# Patient Record
Sex: MALE | Race: BLACK OR AFRICAN AMERICAN | NOT HISPANIC OR LATINO | Employment: PART TIME | URBAN - METROPOLITAN AREA
[De-identification: names, ages, dates, MRNs, and addresses within clinical notes are randomized per-mention and may not be internally consistent; named-entity substitution may affect disease eponyms.]

---

## 2017-02-03 ENCOUNTER — OFFICE VISIT (OUTPATIENT)
Dept: FAMILY MEDICINE | Facility: HOSPITAL | Age: 22
End: 2017-02-03
Attending: FAMILY MEDICINE
Payer: MEDICAID

## 2017-02-03 VITALS
WEIGHT: 190.5 LBS | HEART RATE: 71 BPM | HEIGHT: 69 IN | BODY MASS INDEX: 28.22 KG/M2 | SYSTOLIC BLOOD PRESSURE: 163 MMHG | DIASTOLIC BLOOD PRESSURE: 95 MMHG

## 2017-02-03 DIAGNOSIS — Z11.3 SCREEN FOR STD (SEXUALLY TRANSMITTED DISEASE): Primary | ICD-10-CM

## 2017-02-03 DIAGNOSIS — I15.9 SECONDARY HYPERTENSION: ICD-10-CM

## 2017-02-03 PROCEDURE — 99213 OFFICE O/P EST LOW 20 MIN: CPT

## 2017-02-03 NOTE — PROGRESS NOTES
"Subjective:       Patient ID: Claudy Alatorre is a 21 y.o. male.    Chief Complaint: Back Pain    HPI   20 y/o F PMHx HTN comes in for lower back pain and headaches.  Patient reports that he has been having severe 10/10 headaches that occur constantly and has not stopped for the past 10 years. Patient has taken xanax from his friends in the past that has alleviated his headache. Denies blurry vision, nausea, vomiting, or extremity weakness.  States that "I'm not here to get xanax but can you give me something for this headache." Reports that he was HTN in the past and has never been treated because he states "I don't know. I just never did." Also comes in reporting of paraspinal back pain that has been going on for 5 years and describes it as aching and stabbing pains. Denies trauma or heavy lifting. Patient also reports of unprotected sex with several women in the past several months.  States that he does not like to use condoms but is concerned that he may have an STD. Denies dysuria, hematuria, or pus from penis. Patient denies nausea, vomiting, coughing, chest pain, palpitations, SOB, abdominal pain, changes in urination, or changes in bowel movement.     PMHx: HTN  NKDA  PSHx: None  FH: mom with HTN  SH: denies smoking or illicit drug use. Endorses social drinking.    Review of Systems   Constitutional: Negative for chills, fatigue, fever and unexpected weight change.   HENT: Negative for ear pain, rhinorrhea, sneezing and sore throat.    Eyes: Negative for visual disturbance.   Respiratory: Negative for cough, chest tightness and shortness of breath.    Cardiovascular: Negative for chest pain.   Gastrointestinal: Negative for abdominal pain, constipation, diarrhea, nausea and vomiting.   Endocrine: Negative for polyuria.   Genitourinary: Negative for dysuria and hematuria.   Neurological: Negative for seizures and headaches.       Objective:      Vitals:    02/03/17 1141   BP: (!) 163/95   Pulse: 71     Physical " Exam   Constitutional: He is oriented to person, place, and time. He appears well-developed and well-nourished. No distress.   HENT:   Head: Normocephalic and atraumatic.   Right Ear: External ear normal.   Left Ear: External ear normal.   Nose: Nose normal.   Mouth/Throat: Oropharynx is clear and moist. No oropharyngeal exudate.   Eyes: Conjunctivae and EOM are normal. Pupils are equal, round, and reactive to light. Right eye exhibits no discharge. Left eye exhibits no discharge. No scleral icterus.   Neck: Normal range of motion. Neck supple. No thyromegaly present.   Cardiovascular: Normal rate, regular rhythm, normal heart sounds and intact distal pulses.  Exam reveals no gallop and no friction rub.    No murmur heard.  Pulmonary/Chest: Effort normal and breath sounds normal. No respiratory distress. He has no wheezes. He has no rales. He exhibits no tenderness.   Abdominal: Soft. Bowel sounds are normal. He exhibits no distension and no mass. There is no tenderness. There is no rebound and no guarding. No hernia.   Musculoskeletal: Normal range of motion. He exhibits no edema, tenderness or deformity.   Full ROM of back, strength 5/5 on BL LE, no tenderness to palpation   Lymphadenopathy:     He has no cervical adenopathy.   Neurological: He is alert and oriented to person, place, and time.   Skin: Skin is warm. He is not diaphoretic.   Psychiatric: He has a normal mood and affect. His behavior is normal. Judgment and thought content normal.   Nursing note and vitals reviewed.      Repeat manual /90  Assessment:       1. Screen for STD (sexually transmitted disease)    2. Secondary hypertension        Plan:       Screen for STD (sexually transmitted disease)  -     C. trachomatis/N. gonorrhoeae by AMP DNA Urine; Future; Expected date: 2/3/17  -     HIV-1 and HIV-2 antibodies; Future; Expected date: 2/3/17  -     RPR; Future; Expected date: 2/3/17  -     Hepatitis panel, acute; Future; Expected date:  2/3/17  -     Comprehensive metabolic panel; Future; Expected date: 2/3/17    Secondary hypertension  -     US Renal Artery Stenosis Hyperten Comp; Future; Expected date: 2/3/17  -     CORTISOL, 8AM; Future; Expected date: 2/3/17  -     Aldosterone/Renin Activity Ratio; Future; Expected date: 2/3/17  -     TSH; Future; Expected date: 2/3/17  -     Toxicology screen, urine; Future; Expected date: 2/3/17  -     CBC auto differential; Future; Expected date: 2/3/17  -     Metanephrines, urine; Future    Due to young age, patient will be worked up for secondary causes of HTN. Will start BP medications on next visit.    Return in about 1 week (around 2/10/2017) for HTN f/u.     Pt discussed with Dr. Zimmerman.    Piter Thornton MD

## 2017-02-06 ENCOUNTER — LAB VISIT (OUTPATIENT)
Dept: LAB | Facility: HOSPITAL | Age: 22
End: 2017-02-06
Payer: MEDICAID

## 2017-02-06 DIAGNOSIS — I15.9 SECONDARY HYPERTENSION: ICD-10-CM

## 2017-02-06 PROCEDURE — 83835 ASSAY OF METANEPHRINES: CPT

## 2017-02-12 LAB
COLLECT DURATION TIME SPEC: NORMAL HR
CREAT 24H UR-MRATE: NORMAL MG/D (ref 1000–2500)
CREAT UR-MCNC: 173 MG/DL
METANEPH 24H UR-MCNC: 182 UG/L
METANEPH 24H UR-MRATE: NORMAL UG/D (ref 62–207)
METANEPH+NORMETANEPH UR-IMP: NORMAL
METANEPH/CREAT 24H UR: 105 UG/G CRT (ref 0–300)
NORMETANEPHRINE 24H UR-MCNC: 393 UG/L
NORMETANEPHRINE 24H UR-MRATE: NORMAL UG/D (ref 95–379)
NORMETANEPHRINE/CREAT 24H UR: 227 UG/G CRT (ref 0–400)
SPECIMEN VOL ?TM UR: NORMAL ML

## 2017-02-24 ENCOUNTER — OFFICE VISIT (OUTPATIENT)
Dept: FAMILY MEDICINE | Facility: HOSPITAL | Age: 22
End: 2017-02-24
Attending: FAMILY MEDICINE
Payer: MEDICAID

## 2017-02-24 VITALS
HEART RATE: 86 BPM | WEIGHT: 205.69 LBS | HEIGHT: 69 IN | SYSTOLIC BLOOD PRESSURE: 138 MMHG | BODY MASS INDEX: 30.47 KG/M2 | DIASTOLIC BLOOD PRESSURE: 72 MMHG

## 2017-02-24 DIAGNOSIS — F41.9 ANXIETY: ICD-10-CM

## 2017-02-24 DIAGNOSIS — G89.29 CHRONIC RIGHT-SIDED THORACIC BACK PAIN: ICD-10-CM

## 2017-02-24 DIAGNOSIS — M54.6 CHRONIC RIGHT-SIDED THORACIC BACK PAIN: ICD-10-CM

## 2017-02-24 DIAGNOSIS — I10 ESSENTIAL HYPERTENSION: Primary | ICD-10-CM

## 2017-02-24 PROCEDURE — 99213 OFFICE O/P EST LOW 20 MIN: CPT

## 2017-02-24 RX ORDER — NIFEDIPINE 30 MG/1
30 TABLET, EXTENDED RELEASE ORAL DAILY
Qty: 30 TABLET | Refills: 1 | Status: SHIPPED | OUTPATIENT
Start: 2017-02-24 | End: 2018-02-24

## 2017-02-24 RX ORDER — BUSPIRONE HYDROCHLORIDE 10 MG/1
10 TABLET ORAL 2 TIMES DAILY
Qty: 60 TABLET | Refills: 1 | Status: SHIPPED | OUTPATIENT
Start: 2017-02-24 | End: 2023-11-04 | Stop reason: DRUGHIGH

## 2017-02-24 NOTE — PROGRESS NOTES
Subjective:       Patient ID: Claudy Alatorre is a 22 y.o. male.    Chief Complaint: Results    HPI   22 year old male with no significant past medical history comes in for follow-up for hypertension, headache and back pain.  Patient reports that patient has not checked his blood pressure at home.  Patient had several blood lab work done to rule-out secondary causes of hypertension which have all came back negative. Patient has not gotten the ultrasound that was ordered. Patient reports to have continued pulsating headache in the frontal area non-radiating that is rated 9/10, with no alleviating and exacerbating factors.  Denies blurry vision, N/V, extremity weakness. Patient states that he takes xanax which he also took today from a friend that has alleviated his BP and his back pain.  Patient reports having right mid lateral back pain described as non-radiating strain-like.  Reports previously lifting heavy boxes. Denies urinary or bowel incontinence. Patient also comes in today wanting copies of STD results.    Review of Systems   Constitutional: Negative for chills, fatigue, fever and unexpected weight change.   HENT: Negative for sore throat.    Eyes: Negative for visual disturbance.   Respiratory: Negative for cough, chest tightness and shortness of breath.    Cardiovascular: Negative for chest pain.   Gastrointestinal: Negative for abdominal pain, constipation, diarrhea, nausea and vomiting.   Endocrine: Negative for polyuria.   Genitourinary: Negative for dysuria and hematuria.   Musculoskeletal: Positive for back pain.   Neurological: Positive for headaches.       Objective:      Vitals:    02/24/17 0851   BP: 138/72   Pulse: 86     Physical Exam   Constitutional: He is oriented to person, place, and time. He appears well-developed and well-nourished. No distress.   HENT:   Head: Normocephalic and atraumatic.   Right Ear: External ear normal.   Left Ear: External ear normal.   Eyes: Conjunctivae and EOM are  normal. Right eye exhibits no discharge. Left eye exhibits no discharge. No scleral icterus.   Neck: Normal range of motion.   Cardiovascular: Normal rate, regular rhythm and normal heart sounds.  Exam reveals no gallop and no friction rub.    No murmur heard.  Pulmonary/Chest: Effort normal and breath sounds normal. No respiratory distress. He has no wheezes. He has no rales. He exhibits no tenderness.   Musculoskeletal: He exhibits no edema or tenderness.   Non-reproducible, non-tender pain on right mid back approximately T5-8 area, full ROM, no signs of trauma, contusions, hematomas, strength 5/5   Neurological: He is alert and oriented to person, place, and time.   Skin: Skin is warm. He is not diaphoretic.   Psychiatric: He has a normal mood and affect. His behavior is normal. Judgment and thought content normal.   Nursing note and vitals reviewed.      Assessment:       1. Essential hypertension    2. Anxiety    3. Chronic right-sided thoracic back pain        Plan:       Essential hypertension  -     nifedipine 30 MG ORAL TR24 (PROCARDIA-XL) 30 MG (OSM) 24 hr tablet; Take 1 tablet (30 mg total) by mouth once daily.  Dispense: 30 tablet; Refill: 1  - Patient reports taking xanax from a friend today.  That may reduce the blood pressure. Advised patient to discontinue xanax and start nifedipine.  - Advised patient to have US renal to rule out secondary causes of hypertension    Anxiety  -     busPIRone (BUSPAR) 10 MG tablet; Take 1 tablet (10 mg total) by mouth 2 (two) times daily.  Dispense: 60 tablet; Refill: 1  -  GAD7 = 27 with somewhat difficultly with ADL.    Chronic right-sided thoracic back pain   - Patient reports lifting heavy objects in the recent pass that may contribute to patient's back pain.  Patient has not gotten US of kidneys to r/o other causes of hypertension. Back pain may be related to kidneys. Patient without any urinary symptoms currently.      Return in about 2 weeks (around 3/10/2017)  for anxiety f/u.

## 2017-02-24 NOTE — PROGRESS NOTES
I assume primary medical responsibility for this patient, I have reviewed the case history, findings, diagnosis and treatment plan with the resident and agree that the care is reasonable and necessary. This service has been performed by a resident without the presence of a teaching physician under the primary care exception  Mona Mosqueda  2/24/2017

## 2018-07-31 ENCOUNTER — HOSPITAL ENCOUNTER (EMERGENCY)
Facility: HOSPITAL | Age: 23
Discharge: HOME | End: 2018-07-31
Attending: SURGERY
Payer: MEDICAID

## 2018-07-31 VITALS
HEIGHT: 69 IN | BODY MASS INDEX: 26.66 KG/M2 | RESPIRATION RATE: 18 BRPM | SYSTOLIC BLOOD PRESSURE: 160 MMHG | HEART RATE: 62 BPM | DIASTOLIC BLOOD PRESSURE: 92 MMHG | OXYGEN SATURATION: 98 % | WEIGHT: 180 LBS | TEMPERATURE: 98 F

## 2018-07-31 DIAGNOSIS — G44.229 CHRONIC TENSION-TYPE HEADACHE, NOT INTRACTABLE: ICD-10-CM

## 2018-07-31 DIAGNOSIS — I10 ESSENTIAL HYPERTENSION: Primary | ICD-10-CM

## 2018-07-31 PROCEDURE — 99283 EMERGENCY DEPT VISIT LOW MDM: CPT

## 2018-07-31 PROCEDURE — 25000003 PHARM REV CODE 250: Performed by: SURGERY

## 2018-07-31 RX ORDER — BUTALBITAL, ACETAMINOPHEN AND CAFFEINE 50; 325; 40 MG/1; MG/1; MG/1
2 TABLET ORAL
Status: COMPLETED | OUTPATIENT
Start: 2018-07-31 | End: 2018-07-31

## 2018-07-31 RX ORDER — CLONIDINE HYDROCHLORIDE 0.2 MG/1
0.2 TABLET ORAL DAILY
Qty: 30 TABLET | Refills: 0 | Status: ON HOLD | OUTPATIENT
Start: 2018-07-31 | End: 2023-11-05 | Stop reason: HOSPADM

## 2018-07-31 RX ORDER — CLONIDINE HYDROCHLORIDE 0.2 MG/1
0.2 TABLET ORAL
Status: COMPLETED | OUTPATIENT
Start: 2018-07-31 | End: 2018-07-31

## 2018-07-31 RX ORDER — BUTALBITAL, ACETAMINOPHEN AND CAFFEINE 50; 325; 40 MG/1; MG/1; MG/1
1 TABLET ORAL EVERY 4 HOURS PRN
Qty: 20 TABLET | Refills: 0 | Status: SHIPPED | OUTPATIENT
Start: 2018-07-31 | End: 2018-08-30

## 2018-07-31 RX ADMIN — CLONIDINE HYDROCHLORIDE 0.2 MG: 0.2 TABLET ORAL at 09:07

## 2018-07-31 RX ADMIN — BUTALBITAL, ACETAMINOPHEN, AND CAFFEINE 2 TABLET: 50; 325; 40 TABLET ORAL at 09:07

## 2018-08-01 NOTE — ED PROVIDER NOTES
Encounter Date: 7/31/2018       History     Chief Complaint   Patient presents with    Hypertension     High BP, has not taken any medication since the end of November 2017.  C/O headache behind eyes daily 8/10.      Noncompliant hypertensive who has not taking any medication since November 2017.  He complains of intermittent headaches rate between his eyes over the sinuses.  He is blood pressure is 160/113 today.  He has no history of cardiac or renal problems      The history is provided by the patient.   Hypertension    This is a recurrent problem. The current episode started several weeks ago. The problem has been unchanged. Associated symptoms include headaches. Pertinent negatives include no chest pain, no neck pain, no dizziness and no shortness of breath. There are no associated agents to hypertension. There are no known risk factors.     Review of patient's allergies indicates:  No Known Allergies  Past Medical History:   Diagnosis Date    Hypertension      History reviewed. No pertinent surgical history.  Family History   Problem Relation Age of Onset    Hypertension Mother      Social History   Substance Use Topics    Smoking status: Never Smoker    Smokeless tobacco: Not on file    Alcohol use No     Review of Systems   Constitutional: Negative.    HENT: Negative.    Eyes: Negative.    Respiratory: Negative for shortness of breath.    Cardiovascular: Negative for chest pain.   Gastrointestinal: Negative.    Endocrine: Negative.    Genitourinary: Negative.    Musculoskeletal: Negative.  Negative for neck pain.   Allergic/Immunologic: Negative.    Neurological: Positive for headaches. Negative for dizziness.   Hematological: Negative.    Psychiatric/Behavioral: Negative.        Physical Exam     Initial Vitals [07/31/18 2122]   BP Pulse Resp Temp SpO2   (!) 164/113 (!) 53 20 98.3 °F (36.8 °C) 98 %      MAP       --         Physical Exam    Nursing note and vitals reviewed.  Constitutional: He appears  well-developed and well-nourished.   HENT:   Head: Normocephalic.   Eyes: Conjunctivae are normal.   Neck: Normal range of motion.   Cardiovascular: Normal rate, regular rhythm, normal heart sounds and intact distal pulses.   Pulmonary/Chest: Breath sounds normal.   Abdominal: Soft.   Neurological: He is alert and oriented to person, place, and time.   Skin: Skin is warm and dry.   Psychiatric: He has a normal mood and affect.         ED Course   Procedures  Labs Reviewed - No data to display       Imaging Results    None          Medical Decision Making:   Initial Assessment:   Noncompliant hypertensive  ED Management:  Normal physical exam.  Recommend follow-up with primary doctor                      Clinical Impression:   The primary encounter diagnosis was Essential hypertension. A diagnosis of Chronic tension-type headache, not intractable was also pertinent to this visit.      Disposition:   Disposition: Discharged  Condition: Stable                        ARSEN Sosa III, MD  07/31/18 4451

## 2018-09-15 ENCOUNTER — HOSPITAL ENCOUNTER (EMERGENCY)
Facility: HOSPITAL | Age: 23
Discharge: HOME | End: 2018-09-15
Attending: FAMILY MEDICINE
Payer: MEDICAID

## 2018-09-15 VITALS
TEMPERATURE: 99 F | WEIGHT: 180 LBS | OXYGEN SATURATION: 98 % | DIASTOLIC BLOOD PRESSURE: 58 MMHG | BODY MASS INDEX: 26.58 KG/M2 | HEART RATE: 75 BPM | RESPIRATION RATE: 20 BRPM | SYSTOLIC BLOOD PRESSURE: 124 MMHG

## 2018-09-15 DIAGNOSIS — S93.412A SPRAIN OF CALCANEOFIBULAR LIGAMENT OF LEFT ANKLE, INITIAL ENCOUNTER: Primary | ICD-10-CM

## 2018-09-15 DIAGNOSIS — T14.90XA INJURY: ICD-10-CM

## 2018-09-15 PROCEDURE — 25000003 PHARM REV CODE 250: Performed by: FAMILY MEDICINE

## 2018-09-15 PROCEDURE — 99283 EMERGENCY DEPT VISIT LOW MDM: CPT

## 2018-09-15 RX ORDER — KETOROLAC TROMETHAMINE 10 MG/1
10 TABLET, FILM COATED ORAL
Status: COMPLETED | OUTPATIENT
Start: 2018-09-15 | End: 2018-09-15

## 2018-09-15 RX ORDER — IBUPROFEN 800 MG/1
800 TABLET ORAL EVERY 6 HOURS PRN
Qty: 20 TABLET | Refills: 0 | Status: SHIPPED | OUTPATIENT
Start: 2018-09-15 | End: 2023-11-04

## 2018-09-15 RX ORDER — HYDROCODONE BITARTRATE AND ACETAMINOPHEN 5; 325 MG/1; MG/1
1 TABLET ORAL
Status: COMPLETED | OUTPATIENT
Start: 2018-09-15 | End: 2018-09-15

## 2018-09-15 RX ADMIN — HYDROCODONE BITARTRATE AND ACETAMINOPHEN 1 TABLET: 5; 325 TABLET ORAL at 02:09

## 2018-09-15 RX ADMIN — KETOROLAC TROMETHAMINE 10 MG: 10 TABLET, FILM COATED ORAL at 02:09

## 2023-11-04 ENCOUNTER — HOSPITAL ENCOUNTER (OUTPATIENT)
Facility: HOSPITAL | Age: 28
Discharge: HOME | End: 2023-11-05
Attending: EMERGENCY MEDICINE | Admitting: INTERNAL MEDICINE
Payer: MEDICAID

## 2023-11-04 DIAGNOSIS — R42 DIZZINESS: ICD-10-CM

## 2023-11-04 DIAGNOSIS — R00.2 PALPITATIONS: ICD-10-CM

## 2023-11-04 DIAGNOSIS — R11.10 VOMITING, UNSPECIFIED VOMITING TYPE, UNSPECIFIED WHETHER NAUSEA PRESENT: Primary | ICD-10-CM

## 2023-11-04 DIAGNOSIS — H53.9 VISION CHANGES: ICD-10-CM

## 2023-11-04 DIAGNOSIS — R42 DIZZINESS AND GIDDINESS: ICD-10-CM

## 2023-11-04 LAB
ALBUMIN SERPL BCP-MCNC: 4 G/DL (ref 3.5–5.2)
ALP SERPL-CCNC: 108 U/L (ref 55–135)
ALT SERPL W/O P-5'-P-CCNC: 29 U/L (ref 10–44)
AMPHET+METHAMPHET UR QL: NEGATIVE
ANION GAP SERPL CALC-SCNC: 11 MMOL/L (ref 8–16)
AST SERPL-CCNC: 28 U/L (ref 10–40)
BARBITURATES UR QL SCN>200 NG/ML: NEGATIVE
BASOPHILS # BLD AUTO: 0.05 K/UL (ref 0–0.2)
BASOPHILS NFR BLD: 0.8 % (ref 0–1.9)
BENZODIAZ UR QL SCN>200 NG/ML: NEGATIVE
BILIRUB SERPL-MCNC: 0.6 MG/DL (ref 0.1–1)
BILIRUB UR QL STRIP: NEGATIVE
BUN SERPL-MCNC: 11 MG/DL (ref 6–20)
BZE UR QL SCN: NEGATIVE
CALCIUM SERPL-MCNC: 9.5 MG/DL (ref 8.7–10.5)
CANNABINOIDS UR QL SCN: NEGATIVE
CHLORIDE SERPL-SCNC: 108 MMOL/L (ref 95–110)
CHOLEST SERPL-MCNC: 166 MG/DL (ref 120–199)
CHOLEST/HDLC SERPL: 4.9 {RATIO} (ref 2–5)
CLARITY UR: CLEAR
CO2 SERPL-SCNC: 22 MMOL/L (ref 23–29)
COLOR UR: NORMAL
CREAT SERPL-MCNC: 1.1 MG/DL (ref 0.5–1.4)
CREAT UR-MCNC: 26 MG/DL (ref 23–375)
DIFFERENTIAL METHOD: NORMAL
EOSINOPHIL # BLD AUTO: 0.2 K/UL (ref 0–0.5)
EOSINOPHIL NFR BLD: 2.7 % (ref 0–8)
ERYTHROCYTE [DISTWIDTH] IN BLOOD BY AUTOMATED COUNT: 12.6 % (ref 11.5–14.5)
EST. GFR  (NO RACE VARIABLE): >60 ML/MIN/1.73 M^2
ESTIMATED AVG GLUCOSE: 111 MG/DL (ref 68–131)
ETHANOL SERPL-MCNC: <10 MG/DL
GLUCOSE SERPL-MCNC: 99 MG/DL (ref 70–110)
GLUCOSE UR QL STRIP: NEGATIVE
HBA1C MFR BLD: 5.5 % (ref 4–5.6)
HCT VFR BLD AUTO: 41.5 % (ref 40–54)
HDLC SERPL-MCNC: 34 MG/DL (ref 40–75)
HDLC SERPL: 20.5 % (ref 20–50)
HGB BLD-MCNC: 14.2 G/DL (ref 14–18)
HGB UR QL STRIP: NEGATIVE
IMM GRANULOCYTES # BLD AUTO: 0.02 K/UL (ref 0–0.04)
IMM GRANULOCYTES NFR BLD AUTO: 0.3 % (ref 0–0.5)
INR PPP: 1.1 (ref 0.8–1.2)
KETONES UR QL STRIP: NEGATIVE
LDLC SERPL CALC-MCNC: 118.2 MG/DL (ref 63–159)
LEUKOCYTE ESTERASE UR QL STRIP: NEGATIVE
LYMPHOCYTES # BLD AUTO: 2.5 K/UL (ref 1–4.8)
LYMPHOCYTES NFR BLD: 39.6 % (ref 18–48)
MCH RBC QN AUTO: 29 PG (ref 27–31)
MCHC RBC AUTO-ENTMCNC: 34.2 G/DL (ref 32–36)
MCV RBC AUTO: 85 FL (ref 82–98)
METHADONE UR QL SCN>300 NG/ML: NEGATIVE
MONOCYTES # BLD AUTO: 0.5 K/UL (ref 0.3–1)
MONOCYTES NFR BLD: 7.5 % (ref 4–15)
NEUTROPHILS # BLD AUTO: 3.1 K/UL (ref 1.8–7.7)
NEUTROPHILS NFR BLD: 49.4 % (ref 38–73)
NITRITE UR QL STRIP: NEGATIVE
NONHDLC SERPL-MCNC: 132 MG/DL
NRBC BLD-RTO: 0 /100 WBC
OPIATES UR QL SCN: NEGATIVE
PCP UR QL SCN>25 NG/ML: NEGATIVE
PH UR STRIP: 6 [PH] (ref 5–8)
PLATELET # BLD AUTO: 291 K/UL (ref 150–450)
PMV BLD AUTO: 9.8 FL (ref 9.2–12.9)
POCT GLUCOSE: 99 MG/DL (ref 70–110)
POTASSIUM SERPL-SCNC: 3.9 MMOL/L (ref 3.5–5.1)
PROT SERPL-MCNC: 7.7 G/DL (ref 6–8.4)
PROT UR QL STRIP: NEGATIVE
PROTHROMBIN TIME: 11.8 SEC (ref 9–12.5)
RBC # BLD AUTO: 4.89 M/UL (ref 4.6–6.2)
SODIUM SERPL-SCNC: 141 MMOL/L (ref 136–145)
SP GR UR STRIP: 1.02 (ref 1–1.03)
TOXICOLOGY INFORMATION: NORMAL
TRIGL SERPL-MCNC: 69 MG/DL (ref 30–150)
TROPONIN I SERPL DL<=0.01 NG/ML-MCNC: <0.006 NG/ML (ref 0–0.03)
TROPONIN I SERPL DL<=0.01 NG/ML-MCNC: <0.006 NG/ML (ref 0–0.03)
TSH SERPL DL<=0.005 MIU/L-ACNC: 1.03 UIU/ML (ref 0.4–4)
URN SPEC COLLECT METH UR: NORMAL
UROBILINOGEN UR STRIP-ACNC: NEGATIVE EU/DL
WBC # BLD AUTO: 6.28 K/UL (ref 3.9–12.7)

## 2023-11-04 PROCEDURE — 99285 EMERGENCY DEPT VISIT HI MDM: CPT | Mod: 25

## 2023-11-04 PROCEDURE — 25500020 PHARM REV CODE 255: Performed by: EMERGENCY MEDICINE

## 2023-11-04 PROCEDURE — 93010 ELECTROCARDIOGRAM REPORT: CPT | Mod: 59,,, | Performed by: INTERNAL MEDICINE

## 2023-11-04 PROCEDURE — G0378 HOSPITAL OBSERVATION PER HR: HCPCS

## 2023-11-04 PROCEDURE — 80307 DRUG TEST PRSMV CHEM ANLYZR: CPT | Performed by: EMERGENCY MEDICINE

## 2023-11-04 PROCEDURE — 82077 ASSAY SPEC XCP UR&BREATH IA: CPT | Performed by: EMERGENCY MEDICINE

## 2023-11-04 PROCEDURE — 96372 THER/PROPH/DIAG INJ SC/IM: CPT | Mod: 59 | Performed by: STUDENT IN AN ORGANIZED HEALTH CARE EDUCATION/TRAINING PROGRAM

## 2023-11-04 PROCEDURE — 80061 LIPID PANEL: CPT | Performed by: EMERGENCY MEDICINE

## 2023-11-04 PROCEDURE — 93010 EKG 12-LEAD: ICD-10-PCS | Mod: 59,,, | Performed by: INTERNAL MEDICINE

## 2023-11-04 PROCEDURE — 84443 ASSAY THYROID STIM HORMONE: CPT | Performed by: EMERGENCY MEDICINE

## 2023-11-04 PROCEDURE — 63600175 PHARM REV CODE 636 W HCPCS: Performed by: STUDENT IN AN ORGANIZED HEALTH CARE EDUCATION/TRAINING PROGRAM

## 2023-11-04 PROCEDURE — 80053 COMPREHEN METABOLIC PANEL: CPT | Performed by: EMERGENCY MEDICINE

## 2023-11-04 PROCEDURE — 82962 GLUCOSE BLOOD TEST: CPT

## 2023-11-04 PROCEDURE — 93005 ELECTROCARDIOGRAM TRACING: CPT

## 2023-11-04 PROCEDURE — 96360 HYDRATION IV INFUSION INIT: CPT

## 2023-11-04 PROCEDURE — 81003 URINALYSIS AUTO W/O SCOPE: CPT | Mod: 59 | Performed by: EMERGENCY MEDICINE

## 2023-11-04 PROCEDURE — 85025 COMPLETE CBC W/AUTO DIFF WBC: CPT | Performed by: EMERGENCY MEDICINE

## 2023-11-04 PROCEDURE — 25000003 PHARM REV CODE 250: Performed by: STUDENT IN AN ORGANIZED HEALTH CARE EDUCATION/TRAINING PROGRAM

## 2023-11-04 PROCEDURE — 25000003 PHARM REV CODE 250: Performed by: EMERGENCY MEDICINE

## 2023-11-04 PROCEDURE — 84484 ASSAY OF TROPONIN QUANT: CPT | Mod: 91 | Performed by: EMERGENCY MEDICINE

## 2023-11-04 PROCEDURE — 85610 PROTHROMBIN TIME: CPT | Performed by: EMERGENCY MEDICINE

## 2023-11-04 PROCEDURE — 83036 HEMOGLOBIN GLYCOSYLATED A1C: CPT | Performed by: STUDENT IN AN ORGANIZED HEALTH CARE EDUCATION/TRAINING PROGRAM

## 2023-11-04 RX ORDER — ONDANSETRON 2 MG/ML
4 INJECTION INTRAMUSCULAR; INTRAVENOUS EVERY 6 HOURS PRN
Status: DISCONTINUED | OUTPATIENT
Start: 2023-11-04 | End: 2023-11-05 | Stop reason: HOSPADM

## 2023-11-04 RX ORDER — BUPROPION HYDROCHLORIDE 100 MG/1
100 TABLET, EXTENDED RELEASE ORAL 2 TIMES DAILY
COMMUNITY
Start: 2023-08-16

## 2023-11-04 RX ORDER — IBUPROFEN 200 MG
24 TABLET ORAL
Status: DISCONTINUED | OUTPATIENT
Start: 2023-11-04 | End: 2023-11-05 | Stop reason: HOSPADM

## 2023-11-04 RX ORDER — AMLODIPINE BESYLATE 10 MG/1
10 TABLET ORAL DAILY
COMMUNITY
Start: 2023-10-11

## 2023-11-04 RX ORDER — ENOXAPARIN SODIUM 100 MG/ML
40 INJECTION SUBCUTANEOUS
Status: DISCONTINUED | OUTPATIENT
Start: 2023-11-04 | End: 2023-11-05 | Stop reason: HOSPADM

## 2023-11-04 RX ORDER — SODIUM CHLORIDE 0.9 % (FLUSH) 0.9 %
10 SYRINGE (ML) INJECTION EVERY 12 HOURS PRN
Status: DISCONTINUED | OUTPATIENT
Start: 2023-11-04 | End: 2023-11-05 | Stop reason: HOSPADM

## 2023-11-04 RX ORDER — GLUCAGON 1 MG
1 KIT INJECTION
Status: DISCONTINUED | OUTPATIENT
Start: 2023-11-04 | End: 2023-11-05 | Stop reason: HOSPADM

## 2023-11-04 RX ORDER — TIZANIDINE 2 MG/1
2 TABLET ORAL NIGHTLY
Status: ON HOLD | COMMUNITY
Start: 2023-09-21 | End: 2023-11-05 | Stop reason: HOSPADM

## 2023-11-04 RX ORDER — TRAZODONE HYDROCHLORIDE 50 MG/1
50 TABLET ORAL NIGHTLY
COMMUNITY
Start: 2023-10-31

## 2023-11-04 RX ORDER — CITALOPRAM 20 MG/1
20 TABLET, FILM COATED ORAL DAILY
COMMUNITY
Start: 2023-09-21

## 2023-11-04 RX ORDER — NALOXONE HCL 0.4 MG/ML
0.02 VIAL (ML) INJECTION
Status: DISCONTINUED | OUTPATIENT
Start: 2023-11-04 | End: 2023-11-05 | Stop reason: HOSPADM

## 2023-11-04 RX ORDER — PROPRANOLOL HYDROCHLORIDE 40 MG/1
TABLET ORAL
COMMUNITY
Start: 2023-09-13

## 2023-11-04 RX ORDER — GABAPENTIN 300 MG/1
300 CAPSULE ORAL 3 TIMES DAILY
COMMUNITY
Start: 2023-09-21

## 2023-11-04 RX ORDER — NALTREXONE 380 MG
KIT INTRAMUSCULAR
Status: ON HOLD | COMMUNITY
Start: 2023-08-16 | End: 2023-11-05 | Stop reason: HOSPADM

## 2023-11-04 RX ORDER — MECLIZINE HYDROCHLORIDE 25 MG/1
50 TABLET ORAL
Status: COMPLETED | OUTPATIENT
Start: 2023-11-04 | End: 2023-11-04

## 2023-11-04 RX ORDER — HYDROXYZINE HYDROCHLORIDE 50 MG/1
50 TABLET, FILM COATED ORAL 3 TIMES DAILY
COMMUNITY
Start: 2023-09-21

## 2023-11-04 RX ORDER — AMLODIPINE BESYLATE 5 MG/1
5 TABLET ORAL DAILY
Status: DISCONTINUED | OUTPATIENT
Start: 2023-11-05 | End: 2023-11-05 | Stop reason: HOSPADM

## 2023-11-04 RX ORDER — GABAPENTIN 300 MG/1
300 CAPSULE ORAL 3 TIMES DAILY
Status: DISCONTINUED | OUTPATIENT
Start: 2023-11-04 | End: 2023-11-05 | Stop reason: HOSPADM

## 2023-11-04 RX ORDER — BUSPIRONE HYDROCHLORIDE 15 MG/1
15 TABLET ORAL 3 TIMES DAILY
COMMUNITY
Start: 2023-08-16

## 2023-11-04 RX ORDER — BUSPIRONE HYDROCHLORIDE 5 MG/1
10 TABLET ORAL 2 TIMES DAILY
Status: DISCONTINUED | OUTPATIENT
Start: 2023-11-04 | End: 2023-11-05 | Stop reason: HOSPADM

## 2023-11-04 RX ORDER — IBUPROFEN 200 MG
16 TABLET ORAL
Status: DISCONTINUED | OUTPATIENT
Start: 2023-11-04 | End: 2023-11-05 | Stop reason: HOSPADM

## 2023-11-04 RX ADMIN — GABAPENTIN 300 MG: 300 CAPSULE ORAL at 09:11

## 2023-11-04 RX ADMIN — IOHEXOL 100 ML: 350 INJECTION, SOLUTION INTRAVENOUS at 01:11

## 2023-11-04 RX ADMIN — ENOXAPARIN SODIUM 40 MG: 40 INJECTION SUBCUTANEOUS at 09:11

## 2023-11-04 RX ADMIN — SODIUM CHLORIDE 1000 ML: 9 INJECTION, SOLUTION INTRAVENOUS at 01:11

## 2023-11-04 RX ADMIN — BUSPIRONE HYDROCHLORIDE 10 MG: 5 TABLET ORAL at 09:11

## 2023-11-04 RX ADMIN — MECLIZINE HYDROCHLORIDE 50 MG: 25 TABLET ORAL at 01:11

## 2023-11-04 NOTE — ED NOTES
Pt states felt dizzy upon standing during orthostatics;    Urinal provided, pt informed of need for drug screen.

## 2023-11-04 NOTE — ED PROVIDER NOTES
"Encounter Date: 11/4/2023       History     Chief Complaint   Patient presents with    Dizziness     Dizziness that started yesterday around 1300. Feels as though "vision is slow". No numbness, slurred speech, arm drift, or facial droop.      RODOLFO Loco is a 28 y.o. M with PMH of htn who presents with dizziness and nausea that started yesterday around 1:30 PM while walking to his car.  He has been able to walk but feels somewhat off balance.  He denies recent trauma, denies headache or abdominal pain or other pain.  He has never had symptoms like this before.  He does endorse some double vision but no blurry vision.  He denies numbness or weakness.  Went to [facility] yesterday and got labs but left before being seen by a clinician.      He had 6 episodes of vomiting yesterday after the dizziness started.  He has not had vomiting today but does feel nauseous.  Denies diarrhea.  States he has been drinking some water today.  He denies drug use but states that he lives in an area with a lot of drug users and is worried he may have had passive exposure to drugs.   Denies any recent illness.    Review of patient's allergies indicates:  No Known Allergies  Past Medical History:   Diagnosis Date    Hypertension      No past surgical history on file.  Family History   Problem Relation Age of Onset    Hypertension Mother      Social History     Tobacco Use    Smoking status: Never    Smokeless tobacco: Never   Substance Use Topics    Alcohol use: No    Drug use: No     Review of Systems    Physical Exam     Initial Vitals [11/04/23 1225]   BP Pulse Resp Temp SpO2   (!) 140/83 64 18 98.5 °F (36.9 °C) 98 %      MAP       --         Physical Exam  General: Awake and alert, well-nourished  HENT: moist mucous membranes  Eyes: No conjunctival injection  Pulm: CTAB, no increased work of breathing  CV: Regular rate and rhythm, no murmur noted  Abdomen: Nondistended, non-tender to palpation  MSK: No LE edema  Skin: No rash noted  Neuro: " GCS 15, normal mentation, CN II-XII intact, normal sensation to fine touch in arms and legs, 5/5 strength bilaterally with elbow flexion/extension, hip flexion, knee flexion/extension, and ankle plantar/dorsiflexion.  Somewhat unstable gait.  He does have mild lateral nystagmus when looking to the right, no vertical nystagmus.  Normal alternating cover test.  He does have mild corrective saccade with head impulse testing.    Psychiatric: Cooperative      ED Course   Procedures  Labs Reviewed   COMPREHENSIVE METABOLIC PANEL - Abnormal; Notable for the following components:       Result Value    CO2 22 (*)     All other components within normal limits   LIPID PANEL - Abnormal; Notable for the following components:    HDL 34 (*)     All other components within normal limits   TROPONIN I   CBC W/ AUTO DIFFERENTIAL   DRUG SCREEN PANEL, URINE EMERGENCY    Narrative:     Specimen Source->Urine   PROTIME-INR   TSH   ALCOHOL,MEDICAL (ETHANOL)   TROPONIN I   HEMOGLOBIN A1C   URINALYSIS, REFLEX TO URINE CULTURE   URINALYSIS, REFLEX TO URINE CULTURE    Narrative:     Specimen Source->Urine   POCT GLUCOSE     EKG Readings: (Independently Interpreted)   Sinus bradycardia, normal axis, normal intervals, slight ST elevationin 1 and aVL though not meeting STEMI criteria, does have ST depression and T wave inversions inferiorly concerning for ischemia.    Repeat EKG is without dynamic ST segment or T wave changes.       Imaging Results              CTA Head and Neck (xpd) (Final result)  Result time 11/04/23 13:45:07      Final result by Abdirizak Flynn MD (11/04/23 13:45:07)                   Impression:      1. No acute intracranial findings.  2. No acute large vessel occlusion or flow-limiting stenosis.  3. Question medialization of the right true vocal cord with dilatation of the ipsilateral piriform sinus.  Findings nonspecific, but may be seen in setting of right vocal cord paralysis/paresis.  Clinical correlation  recommended in this regard.  No definite abnormality seen along the anticipated course of the right vagus or recurrent laryngeal nerves.  4. Dental caries and periapical lucencies, possible periapical abscesses as described.  Clinical correlation recommended.      Electronically signed by: Abdirizak Flynn  Date:    11/04/2023  Time:    13:45               Narrative:    EXAMINATION:  CTA HEAD AND NECK (XPD)    CLINICAL HISTORY:  Neuro deficit, acute, stroke suspected;    TECHNIQUE:  Non contrast low dose axial images were obtained through the head.  CT angiogram was performed from the level of the juan daniel to the top of the head following the IV administration of 100mL of Omnipaque 350.   Sagittal and coronal reconstructions and maximum intensity projection reconstructions were performed. Arterial stenosis percentages are based on NASCET measurement criteria.    COMPARISON:  CT head 07/25/2015.    FINDINGS:  CT HEAD:    Blood: No acute intracranial hemorrhage.    Parenchyma: No definite loss of gray-white differentiation to suggest acute or subacute transcortical infarct.    Ventricles/Extra-axial spaces: No abnormal extra-axial fluid collection. Basal cisterns are patent.    Vessels: See below.    Orbits: Unremarkable.    Scalp: Unremarkable.    Skull: There are no depressed skull fractures or destructive bone lesions.    Sinuses and mastoids: Scattered paranasal sinus mucosal thickening retention cysts.    Other findings: None    CTA:    NECK:    Imaged aortic arch: Nonaneurysmal.  Left-sided arch with conventional 3 vessel arch anatomy.  Brachiocephalic and subclavian arteries appear patent.    Right common and cervical internal carotid arteries: CCA and ECA appear patent.  No flow-limiting stenosis of the proximal ICA.  No evidence of carotid dissection or web.    Left common and cervical internal carotid arteries: CCA and ECA appear patent.  No flow-limiting stenosis of the proximal ICA.  No evidence of carotid  dissection or web.    Right cervical vertebral artery: There is no hemodynamically significant stenosis or dissection    Left cervical vertebral artery: There is no hemodynamically significant stenosis or dissection. Left vertebral artery appears slightly dominant.    HEAD:    Right anterior circulation:Normal Right ophthalmic artery is patent.    Left anterior circulation: NormalLeft ophthalmic artery is patent.    Posterior circulation: There is no hemodynamically significant vertebrobasilar stenosis. There is no significant PCA stenosis. Posterior inferior cerebellar arteries patent at origin.    Anterior and posterior communicating arteries: Suspect small patent anterior communicating artery.  Posterior communicating arteries not reliably visualized.    NON-ANGIOGRAPHIC FINDINGS:    Visualized intracranial contents: As above.    Soft tissues of the neck: Question medialization of the right true vocal cord with dilatation of the ipsilateral piriform sinus.  No definite abnormality seen along the anticipated course of the right vagus or recurrent laryngeal nerves.    Visualized sinuses: As above.    Dentition: Dental caries.  Periapical lucencies noted about the left mandibular 1st molar and right maxillary 1st molar tooth.    Spine: Unremarkable.    Visualized lungs: Clear.                                       Medications   sodium chloride 0.9% flush 10 mL (has no administration in time range)   naloxone 0.4 mg/mL injection 0.02 mg (has no administration in time range)   glucose chewable tablet 16 g (has no administration in time range)   glucose chewable tablet 24 g (has no administration in time range)   glucagon (human recombinant) injection 1 mg (has no administration in time range)   enoxaparin injection 40 mg (40 mg Subcutaneous Given 11/4/23 2133)   amLODIPine tablet 5 mg (5 mg Oral Given 11/5/23 1009)   busPIRone tablet 10 mg (10 mg Oral Given 11/5/23 1009)   gabapentin capsule 300 mg (300 mg Oral Not  Given 11/5/23 1500)   dextrose 10% bolus 125 mL 125 mL (has no administration in time range)   dextrose 10% bolus 250 mL 250 mL (has no administration in time range)   ondansetron injection 4 mg (has no administration in time range)   sodium chloride 0.9% bolus 1,000 mL 1,000 mL (0 mLs Intravenous Stopped 11/4/23 1405)   meclizine tablet 50 mg (50 mg Oral Given 11/4/23 1314)   iohexoL (OMNIPAQUE 350) injection 100 mL (100 mLs Intravenous Given 11/4/23 1303)     Medical Decision Making  Pt overall well appearing.  Neuro exam seems suggestive of a peripheral HINTS exam though he did have a bit of trouble cooperating with head impulse testing so I do not feel I can fully trust his HIT findings. He is within 24 hours of symptom onset, overall I have relatively low concern for stroke given his age but because of his complaint of double vision as well will get CT head and CTA head and neck, I contacted CT and they can get him in right away.  If CT is negative for bleed or vessel occlusion I would doubt stroke and treat symptomatically for peripheral vertigo before considering MRI.      CTA was negative for acute neurologic findings.      Initial EKG is slightly concerning for ischemia so will get troponin and cardiac workup as well.  Does not meet STEMI criteria and no significant change on repeat EKG but does have mild ST elevation in leads 1 and aVL and mild ST depression in inferior leads.  He does not have any chest pain or other anginal equivalent though so would not activate STEMI alert at this time.  IV fluids and meclizine ordered.    CBC and CMP unremarkable and troponin negative x2.  Some improvement in his vertigo after meclizine though still present to some extent, he is able to walk steadily now though.  Dearing hallpike was negative for me after meclizine had been received.  Overall suspect peripheral cause of vertigo.  Given somewhat persistent dizziness and his significant EKG abnormalities I did shared  decision making with pt regarding outpatient cardiology follow up vs admission for cardiac workup now, pt elected to admit for cardiac workup and I think this is reasonable given ST depressions in inferior leads with no prior EKG to compare.  I discussed with hospital team and they agreed to admit for further care and workup.    Amount and/or Complexity of Data Reviewed  Labs: ordered.  Radiology: ordered.    Risk  Prescription drug management.                               Clinical Impression:   Final diagnoses:  [R42] Dizziness        ED Disposition Condition    Observation Stable                Marlo Gilmore MD  11/05/23 9115

## 2023-11-04 NOTE — ED NOTES
Pt presents to ED for dizziness, double vision and vomiting 6x yesterday. States was walking, looked down, then back up when s/s presented around 1330. Pt denies drug or alcohol use.    Went to [facility] and left without being seen. Pt continues to endorse nausea without vomiting today.    Code stroke was called in triage, de-escalated by Dr. Gilmore. MD remains at bs at this time.

## 2023-11-04 NOTE — ED NOTES
Pt ambulatory to bathroom with steady gait, pt denies feeling imbalanced and endorses significant improvement of dizziness though not completely resolved. MD informed.

## 2023-11-04 NOTE — PHARMACY MED REC
"[facility]           Pharmacy  Admission Medication History     The home medication history was taken by Rosalinda Toledo.      Medication history obtained from Medications listed below were obtained from: Patient/family    Based on information gathered for medication list, you may go to "Admission" then "Reconcile Home Medications" tabs to review and/or act upon those items.     The home medication list has been updated by the Pharmacy department.   Please read ALL comments highlighted in yellow.   Please address this information as you see fit.    Feel free to contact us if you have any questions or require assistance.    The medications listed below were removed from the home medication list.  Please reorder if appropriate:    Patient reports NOT TAKING the following medication(s):  Ibuprofen 800mg        No current facility-administered medications on file prior to encounter.     Current Outpatient Medications on File Prior to Encounter   Medication Sig Dispense Refill    amLODIPine (NORVASC) 10 MG tablet Take 10 mg by mouth once daily.      busPIRone (BUSPAR) 15 MG tablet Take 15 mg by mouth 3 (three) times daily.      citalopram (CELEXA) 20 MG tablet Take 20 mg by mouth once daily.      gabapentin (NEURONTIN) 300 MG capsule Take 300 mg by mouth 3 (three) times daily.      hydrOXYzine (ATARAX) 50 MG tablet Take 50 mg by mouth 3 (three) times daily.      traZODone (DESYREL) 50 MG tablet Take 50 mg by mouth every evening.      buPROPion (WELLBUTRIN SR) 100 MG TBSR 12 hr tablet Take 100 mg by mouth 2 (two) times daily.      cloNIDine (CATAPRES) 0.2 MG tablet Take 1 tablet (0.2 mg total) by mouth once daily. for 30 doses (Patient not taking: Reported on 11/4/2023) 30 tablet 0    nifedipine 30 MG ORAL TR24 (PROCARDIA-XL) 30 MG (OSM) 24 hr tablet Take 1 tablet (30 mg total) by mouth once daily. (Patient not taking: Reported on 11/4/2023) 30 tablet 1    propranoloL (INDERAL) 40 MG tablet Take by " mouth.      tiZANidine (ZANAFLEX) 2 MG tablet Take 2 mg by mouth every evening.      VIVITROL 380 mg SSRR kit Inject into the muscle.         Please address this information as you see fit.  Feel free to contact us if you have any questions or require assistance.    Rosalinda Toledo  [phone]                  .
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
## 2023-11-05 VITALS
WEIGHT: 265 LBS | SYSTOLIC BLOOD PRESSURE: 139 MMHG | HEART RATE: 65 BPM | BODY MASS INDEX: 39.25 KG/M2 | OXYGEN SATURATION: 98 % | HEIGHT: 69 IN | DIASTOLIC BLOOD PRESSURE: 71 MMHG | TEMPERATURE: 98 F | RESPIRATION RATE: 20 BRPM

## 2023-11-05 PROBLEM — H53.9 VISION CHANGES: Status: RESOLVED | Noted: 2023-11-04 | Resolved: 2023-11-05

## 2023-11-05 PROBLEM — R42 VERTIGO: Status: ACTIVE | Noted: 2023-11-05

## 2023-11-05 PROBLEM — R00.2 PALPITATIONS: Status: RESOLVED | Noted: 2023-11-04 | Resolved: 2023-11-05

## 2023-11-05 PROBLEM — R11.10 EMESIS: Status: RESOLVED | Noted: 2023-11-04 | Resolved: 2023-11-05

## 2023-11-05 LAB
ALBUMIN SERPL BCP-MCNC: 3.6 G/DL (ref 3.5–5.2)
ALP SERPL-CCNC: 116 U/L (ref 55–135)
ALT SERPL W/O P-5'-P-CCNC: 26 U/L (ref 10–44)
ANION GAP SERPL CALC-SCNC: 9 MMOL/L (ref 8–16)
AST SERPL-CCNC: 26 U/L (ref 10–40)
BASOPHILS # BLD AUTO: 0.05 K/UL (ref 0–0.2)
BASOPHILS NFR BLD: 0.7 % (ref 0–1.9)
BILIRUB SERPL-MCNC: 0.2 MG/DL (ref 0.1–1)
BUN SERPL-MCNC: 16 MG/DL (ref 6–20)
CALCIUM SERPL-MCNC: 9.1 MG/DL (ref 8.7–10.5)
CHLORIDE SERPL-SCNC: 111 MMOL/L (ref 95–110)
CO2 SERPL-SCNC: 21 MMOL/L (ref 23–29)
CREAT SERPL-MCNC: 1.3 MG/DL (ref 0.5–1.4)
DIFFERENTIAL METHOD: ABNORMAL
EOSINOPHIL # BLD AUTO: 0.3 K/UL (ref 0–0.5)
EOSINOPHIL NFR BLD: 4.4 % (ref 0–8)
ERYTHROCYTE [DISTWIDTH] IN BLOOD BY AUTOMATED COUNT: 12.7 % (ref 11.5–14.5)
EST. GFR  (NO RACE VARIABLE): >60 ML/MIN/1.73 M^2
GLUCOSE SERPL-MCNC: 106 MG/DL (ref 70–110)
HCT VFR BLD AUTO: 40.2 % (ref 40–54)
HGB BLD-MCNC: 13.6 G/DL (ref 14–18)
HIV 1+2 AB+HIV1 P24 AG SERPL QL IA: NORMAL
IMM GRANULOCYTES # BLD AUTO: 0.02 K/UL (ref 0–0.04)
IMM GRANULOCYTES NFR BLD AUTO: 0.3 % (ref 0–0.5)
LYMPHOCYTES # BLD AUTO: 2.9 K/UL (ref 1–4.8)
LYMPHOCYTES NFR BLD: 41.5 % (ref 18–48)
MAGNESIUM SERPL-MCNC: 2.1 MG/DL (ref 1.6–2.6)
MCH RBC QN AUTO: 29.1 PG (ref 27–31)
MCHC RBC AUTO-ENTMCNC: 33.8 G/DL (ref 32–36)
MCV RBC AUTO: 86 FL (ref 82–98)
MONOCYTES # BLD AUTO: 0.5 K/UL (ref 0.3–1)
MONOCYTES NFR BLD: 7.6 % (ref 4–15)
NEUTROPHILS # BLD AUTO: 3.3 K/UL (ref 1.8–7.7)
NEUTROPHILS NFR BLD: 45.8 % (ref 38–73)
NRBC BLD-RTO: 0 /100 WBC
PHOSPHATE SERPL-MCNC: 4.1 MG/DL (ref 2.7–4.5)
PLATELET # BLD AUTO: 277 K/UL (ref 150–450)
PMV BLD AUTO: 10.2 FL (ref 9.2–12.9)
POTASSIUM SERPL-SCNC: 3.8 MMOL/L (ref 3.5–5.1)
PROT SERPL-MCNC: 7.2 G/DL (ref 6–8.4)
RBC # BLD AUTO: 4.67 M/UL (ref 4.6–6.2)
SODIUM SERPL-SCNC: 141 MMOL/L (ref 136–145)
WBC # BLD AUTO: 7.09 K/UL (ref 3.9–12.7)

## 2023-11-05 PROCEDURE — G0378 HOSPITAL OBSERVATION PER HR: HCPCS

## 2023-11-05 PROCEDURE — 85025 COMPLETE CBC W/AUTO DIFF WBC: CPT | Performed by: STUDENT IN AN ORGANIZED HEALTH CARE EDUCATION/TRAINING PROGRAM

## 2023-11-05 PROCEDURE — 84100 ASSAY OF PHOSPHORUS: CPT | Performed by: STUDENT IN AN ORGANIZED HEALTH CARE EDUCATION/TRAINING PROGRAM

## 2023-11-05 PROCEDURE — 25000003 PHARM REV CODE 250: Performed by: STUDENT IN AN ORGANIZED HEALTH CARE EDUCATION/TRAINING PROGRAM

## 2023-11-05 PROCEDURE — 87389 HIV-1 AG W/HIV-1&-2 AB AG IA: CPT | Performed by: STUDENT IN AN ORGANIZED HEALTH CARE EDUCATION/TRAINING PROGRAM

## 2023-11-05 PROCEDURE — 83735 ASSAY OF MAGNESIUM: CPT | Performed by: STUDENT IN AN ORGANIZED HEALTH CARE EDUCATION/TRAINING PROGRAM

## 2023-11-05 PROCEDURE — 80053 COMPREHEN METABOLIC PANEL: CPT | Performed by: STUDENT IN AN ORGANIZED HEALTH CARE EDUCATION/TRAINING PROGRAM

## 2023-11-05 PROCEDURE — 36415 COLL VENOUS BLD VENIPUNCTURE: CPT | Performed by: STUDENT IN AN ORGANIZED HEALTH CARE EDUCATION/TRAINING PROGRAM

## 2023-11-05 RX ORDER — PROMETHAZINE HYDROCHLORIDE 25 MG/1
25 TABLET ORAL EVERY 6 HOURS PRN
Qty: 10 TABLET | Refills: 0 | Status: SHIPPED | OUTPATIENT
Start: 2023-11-05 | End: 2023-11-10

## 2023-11-05 RX ORDER — MECLIZINE HCL 12.5 MG 12.5 MG/1
12.5 TABLET ORAL 2 TIMES DAILY PRN
Qty: 10 TABLET | Refills: 0 | Status: SHIPPED | OUTPATIENT
Start: 2023-11-05 | End: 2023-11-10

## 2023-11-05 RX ORDER — ONDANSETRON 4 MG/1
4 TABLET, FILM COATED ORAL EVERY 6 HOURS PRN
Qty: 20 TABLET | Refills: 0 | Status: SHIPPED | OUTPATIENT
Start: 2023-11-05 | End: 2023-11-10

## 2023-11-05 RX ADMIN — GABAPENTIN 300 MG: 300 CAPSULE ORAL at 10:11

## 2023-11-05 RX ADMIN — BUSPIRONE HYDROCHLORIDE 10 MG: 5 TABLET ORAL at 10:11

## 2023-11-05 RX ADMIN — AMLODIPINE BESYLATE 5 MG: 5 TABLET ORAL at 10:11

## 2023-11-05 NOTE — PLAN OF CARE
VIRTUAL NURSE:  Labs, notes, orders, and careplan reviewed. VN to be available as needed.    Problem: Adult Inpatient Plan of Care  Goal: Plan of Care Review  11/5/2023 0905 by Malinda Clinton RN  Outcome: Ongoing, Progressing  11/4/2023 2031 by Malinda Clinton RN  Outcome: Ongoing, Progressing  Goal: Patient-Specific Goal (Individualized)  11/5/2023 0905 by Malinda Clinton RN  Outcome: Ongoing, Progressing  11/4/2023 2031 by Malinda Clinton RN  Outcome: Ongoing, Progressing  Goal: Absence of Hospital-Acquired Illness or Injury  11/5/2023 0905 by Malinda Clinton RN  Outcome: Ongoing, Progressing  11/4/2023 2031 by Malinda Clinton RN  Outcome: Ongoing, Progressing  Goal: Optimal Comfort and Wellbeing  11/5/2023 0905 by Malinda Clinton RN  Outcome: Ongoing, Progressing  11/4/2023 2031 by Malinda Clinton RN  Outcome: Ongoing, Progressing  Goal: Readiness for Transition of Care  11/5/2023 0905 by Malinda Clinton RN  Outcome: Ongoing, Progressing  11/4/2023 2031 by Malinda Clinton RN  Outcome: Ongoing, Progressing     Problem: Fall Injury Risk  Goal: Absence of Fall and Fall-Related Injury  11/5/2023 0905 by Malinda Clinton RN  Outcome: Ongoing, Progressing  11/4/2023 2031 by Malinda Clinton RN  Outcome: Ongoing, Progressing     Problem: Syncope  Goal: Absence of Syncopal Symptoms  11/5/2023 0905 by Malinda Clinton RN  Outcome: Ongoing, Progressing  11/4/2023 2031 by Malinda Clinton RN  Outcome: Ongoing, Progressing     Problem: Hypertension Comorbidity  Goal: Blood Pressure in Desired Range  11/5/2023 0905 by Malinda Clinton RN  Outcome: Ongoing, Progressing  11/4/2023 2031 by Malinda Clinton RN  Outcome: Ongoing, Progressing

## 2023-11-05 NOTE — PLAN OF CARE
Pt will dc with no needs noted. Pt will transport himself home. No additional cm needs. Pt will request own PCP follow up.     SW requested ENT follow up    Cleared from CM . Bedside Nurse and VN notified.     11/05/23 5200   Final Note   Assessment Type Final Discharge Note   Anticipated Discharge Disposition Home   Hospital Resources/Appts/Education Provided Appointments scheduled and added to AVS   Post-Acute Status   Discharge Delays None known at this time
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
## 2023-11-05 NOTE — PROGRESS NOTES
"[facility]           Pharmacy  Admission Medication Reconciliation     Based on information gathered for medication list, you may go to "Admission" then "Reconcile Home Medications" tabs to review and/or act upon those items.     The home medication list has been updated by the Pharmacy department.   Please read ALL comments highlighted in red.   Please address this information as you see fit.    Feel free to contact us if you have any questions or require assistance.    Home medication list has been compared to current inpatient medications. Please review the following discrepancies noted below:    Patient reports STILL TAKING the following medication(s) which was not ordered upon admit  Hydroxyzine 50mg 3 times daily  Trazodone 50mg every evening    Feel free to contact us if you have any questions or require assistance.    Emma Yancey, PharmD  [phone]    "
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
## 2023-11-05 NOTE — PROGRESS NOTES
Discharge orders noted. Additional clinical references attached.    Patient's discharge instructions given by bedside RN  and reviewed.  Education provided on new medication, diagnosis, and follow-up appointments.  Teach back method used. Patient verbalized understanding. All questions answered. Transport to [facility] requested.      11/05/23 1346   AVS Confirmation   Discharge instructions and AVS given to and reviewed with patient and/or significant other. Yes

## 2023-11-05 NOTE — PLAN OF CARE
VIRTUAL NURSE:  Labs, notes, orders, and careplan reviewed. VN to be available as needed.    Problem: Adult Inpatient Plan of Care  Goal: Plan of Care Review  Outcome: Ongoing, Progressing  Goal: Patient-Specific Goal (Individualized)  Outcome: Ongoing, Progressing  Goal: Absence of Hospital-Acquired Illness or Injury  Outcome: Ongoing, Progressing  Goal: Optimal Comfort and Wellbeing  Outcome: Ongoing, Progressing  Goal: Readiness for Transition of Care  Outcome: Ongoing, Progressing     Problem: Fall Injury Risk  Goal: Absence of Fall and Fall-Related Injury  Outcome: Ongoing, Progressing     Problem: Syncope  Goal: Absence of Syncopal Symptoms  Outcome: Ongoing, Progressing     Problem: Hypertension Comorbidity  Goal: Blood Pressure in Desired Range  Outcome: Ongoing, Progressing

## 2023-11-05 NOTE — PLAN OF CARE
Problem: Adult Inpatient Plan of Care  Goal: Plan of Care Review  11/4/2023 2246 by Deepti Rico, RN  Outcome: Ongoing, Progressing  Chart check complete. Vitals, orders, labs, and progress notes reviewed. Care plan updated. Will monitor.

## 2023-11-05 NOTE — PROGRESS NOTES
VIRTUAL NURSE:  Cued into patient's room.  Permission received per patient to turn camera to view patient.  Introduced as VN that will be working with floor nurse and nursing assistant.  Educated patient on VN's role in patient care and  VIP model.  Plan of care reviewed with patient.  Education per flowsheet.   Informed patient that staff will round on them every 2 hours but to use call light for any other needs they may have; informed of fall risk and fall precautions.  Patient verbalized understanding.  Call light within reach; bed siderails up x3.  Opportunity given for questions and questions answered.  Admission assessment questions answered.  Patient denies complaints or any needs at this time. Instructed to call for assistance.  Will cont to monitor and intervene as needed.    Labs, notes, orders, and careplan reviewed.        11/04/23 2032   Admission   Initial VN Admission Questions Complete   Communication Issues? None   Shift   Virtual Nurse - Rounding Complete   Pain Management Interventions relaxation techniques promoted;quiet environment facilitated   Virtual Nurse - Patient Verbalized Approval Of Camera Use;VN Rounding   Type of Frequent Check   Type Patient Rounds   Safety/Activity   Patient Rounds bed in low position;bed wheels locked;call light in patient/parent reach;ID band on;visualized patient

## 2023-11-27 ENCOUNTER — CLINICAL SUPPORT (OUTPATIENT)
Dept: REHABILITATION | Facility: HOSPITAL | Age: 28
End: 2023-11-27
Payer: MEDICAID

## 2023-11-27 DIAGNOSIS — R11.10 VOMITING, UNSPECIFIED VOMITING TYPE, UNSPECIFIED WHETHER NAUSEA PRESENT: ICD-10-CM

## 2023-11-27 PROCEDURE — 97161 PT EVAL LOW COMPLEX 20 MIN: CPT | Mod: PN

## 2023-11-27 PROCEDURE — 97110 THERAPEUTIC EXERCISES: CPT | Mod: PN

## 2023-11-27 NOTE — PLAN OF CARE
[facility]  Physical Therapy Neurological Rehabilitation Initial Evaluation     Name: Claudy Alatorre  [facility] Number: [id number]    Therapy Diagnosis:   Encounter Diagnosis   Name Primary?    Vomiting, unspecified vomiting type, unspecified whether nausea present      Physician: Remi Ambrose MD    Physician Orders: PT Eval and Treat    Medical Diagnosis from Referral: R11.10 (ICD-10-CM) - Vomiting, unspecified vomiting type, unspecified whether nausea present  Evaluation Date: 11/27/2023  Authorization Period Expiration: 11/4/24  Plan of Care Expiration: 1/22/23  Progress Note Due: 12/22/23  Date of Surgery: n/a  Visit # / Visits authorized: 1/ 1  FOTO: 1/ 3    Precautions: Standard    Time In: 1346  Time Out: 1430  Total Billable Time: 44 minutes    Subjective      Date of onset: 11/4/23      History of current condition - Claudy reports: This was the first time he has had episodes of dizziness. Room spinning sensation at onset. Was taking an elevator down at initial onset.  Denies physical traumas or illness around time of onset. Currently feels ongoing dizziness 7/10 at current. At best 5/10. Quick movements make it worse. At times wakes up dizzy. Normally sleeps to right side. Has been taking Meclizine and Zofran/Phenergan daily since prescribed. Last took it 2 hours prior to PT evaluation.      Prior Therapy: n/a  Social History: Lives with family.  Has 3 month old son.   Falls: no   DME: none    Home Environment: elevator to apartment    Exercise Routine / History: none   Family Present at time of Eval: alone   Occupation:  - has episodes of nausea while trying to work.   Prior Level of Function: Independent, working full time.   Current Level of Function: Dizziness impacting daily activity tolerance and work activities.     Pain:  Current 0/10, worst 0/10, best 0/10     Dizziness: 7/10 at current; 5/10 at best past week.     Patient's goals: Get rid of dizziness.     Medical  History:   Past Medical History:   Diagnosis Date    Hypertension        Surgical History:   Claudy Alatorre  has no past surgical history on file.    Medications:   Claudy has a current medication list which includes the following prescription(s): amlodipine, bupropion, buspirone, citalopram, gabapentin, hydroxyzine, meclizine, nifedipine, propranolol, and trazodone.    Allergies:   Review of patient's allergies indicates:  No Known Allergies     Objective      Patient's mobility presenting to therapy evaluation: walks    BP: 138/83  HR 70    Mental status: alert, oriented to person, place, and time  Appearance: Casually dressed  Behavior:  calm and cooperative  Attention Span and Concentration:  Normal  Follows commands:  100% of time   Speech: no deficits     Dominant hand:  right     Posture Alignment in sitting: unremarkable    Posture Alignment in standing: unremarkable     Sensation: Light Touch: Intact     Edema observed: No        Tone: normal  Limbs/muscles affected: n/a    Visual/Auditory: denies changes -  has long distance glasses but only occasionally uses.   Tracking:Intact  R/L discrimination: Intact  Visual field: Intact    Vestibular/Ocular Motor Test:  Headache 0--?10 Dizziness 0--?10 Nausea 0--?10 Fogginess 0--?10    Smooth Pursuits: Intact   Saccades - Horizontal: intact   Saccades - Vertical: intact  Convergence (Near Point) (Near Point in cm):  <10cm  Measure 1:  8cmcm  VOR - Horizontal: intact  VOR - Vertical: intact     Canal Testing:   [place] Hallpike Right posterior/left anterior: negative  [place] Hallpike Left posterior/right anterior: positive for symptoms, no nystagmus noted   Horizontal: NT     Intake Outcome Measure for FOTO   Survey    Therapist reviewed FOTO scores for Claudy Alatorre on 11/27/2023.   FOTO report - see Media section or FOTO account episode details.    Intake Score: 46%       Treatment     Total Treatment time separate from Evaluation: 10 minutes    Claudy received the treatments listed  "below:      Canalith Repositioning Treatment x 10 minutes:   Epley Maneuver for left side treatment - following positive [place]-Hallpike testing   Education pre and post maneuver   Education on ideally no meclozine 48 hours prior to next PT appointment.     Dizziness reported as "decreased" walking out of session.     Patient Education and Home Exercises     Education provided: Diagnosis education on BPPV, plan of care (POC), scheduling     Written Home Exercises Provided:  no .     Assessment     Claudy is a 28 y.o. male referred to outpatient Physical Therapy with a medical diagnosis of vertigo. Patient presents with symptoms consistent with BPPV and was positive in ER for positional testing. Today's session is 2.5 weeks out from onset and he has been taking the Meclizine and Zofran/Phenergan daily which is masking symptoms. His last dose was 2 hours prior to PT testing. Despite meds, he was positive on left side with [place] Hallpike with moderate dizziness during positioning, resolved <20s. No nystagmus observed but pt had to be reminded to keep eyes open multiple times so it may have been missed. No dizziness or nausea after sitting for 30s after Epley maneuver and he reported dizziness felt overall much better upon walking out of clinic. He was intact for all oculomotor screening. Recommended at least x1 more appointment for reassessment of canals and reinforced need to use meds PRN only for acute spinning and no meds 24-48 hours prior to next session. Plan of care (POC) established for 8 weeks if ongoing symptoms are present at next follow up and need further care.     Patient prognosis is Good.   Patient will benefit from skilled outpatient Physical Therapy to address the deficits stated above and in the chart below, provide patient /family education, and to maximize patient's level of independence.     Plan of care discussed with patient: Yes  Patient's spiritual, cultural and educational needs considered and " patient is agreeable to the plan of care and goals as stated below:     Anticipated Barriers for therapy: use of symptom masking meds    Medical Necessity is demonstrated by the following  History  Co-morbidities and personal factors that may impact the plan of care [] LOW: no personal factors / co-morbidities  [x] MODERATE: 1-2 personal factors / co-morbidities  [] HIGH: 3+ personal factors / co-morbidities    Moderate / High Support Documentation:   Co-morbidities affecting plan of care: HTN    Personal Factors:   no deficits     Examination  Body Structures and Functions, activity limitations and participation restrictions that may impact the plan of care [x] LOW: addressing 1-2 elements  [] MODERATE: 3+ elements  [] HIGH: 4+ elements (please support below)    Moderate / High Support Documentation: dizziness     Clinical Presentation [] LOW: stable  [x] MODERATE: Evolving  [] HIGH: Unstable     Decision Making/ Complexity Score: low       Goals:  Short Term Goals= Long Term Goals: 8 weeks   Pt will report dizziness at </= 4/10 entering clinic.   Pt will improve FOTO limitation score to >/= 75% for improved self perception of functional mobility.  Pt will be negative on all positional canal testing.            Plan     Plan of care Certification: 11/27/2023 to 1/.    Outpatient Physical Therapy 1 times weekly for up to 8 weeks to include the following interventions: Gait Training, Moist Heat/ Ice, Neuromuscular Re-ed, Patient Education, Self Care, Therapeutic Activities, Therapeutic Exercise, and Canalith Repositioning .     1st follow up:   Check last Meclozine dose  Retest Canals  If appropriate, provide self treatment handout for Epley at next appointment.   Test Functional Gait Assessment if canals cleared and ongoing balance deficits reported.   OK for discharge if symptoms have resolved.       Angella Miner, PT        Physician's Signature: _________________________________________ Date: ________________

## 2024-02-19 ENCOUNTER — HOSPITAL ENCOUNTER (EMERGENCY)
Facility: HOSPITAL | Age: 29
Discharge: HOME | End: 2024-02-19
Attending: EMERGENCY MEDICINE
Payer: MEDICAID

## 2024-02-19 VITALS
HEART RATE: 74 BPM | DIASTOLIC BLOOD PRESSURE: 89 MMHG | TEMPERATURE: 99 F | RESPIRATION RATE: 16 BRPM | WEIGHT: 162 LBS | SYSTOLIC BLOOD PRESSURE: 134 MMHG | BODY MASS INDEX: 23.92 KG/M2 | OXYGEN SATURATION: 97 %

## 2024-02-19 DIAGNOSIS — R42 VERTIGO: ICD-10-CM

## 2024-02-19 DIAGNOSIS — R42 DIZZINESS: Primary | ICD-10-CM

## 2024-02-19 LAB
ALBUMIN SERPL BCP-MCNC: 4.3 G/DL (ref 3.5–5.2)
ALP SERPL-CCNC: 100 U/L (ref 55–135)
ALT SERPL W/O P-5'-P-CCNC: 56 U/L (ref 10–44)
ANION GAP SERPL CALC-SCNC: 14 MMOL/L (ref 8–16)
AST SERPL-CCNC: 33 U/L (ref 10–40)
BASOPHILS # BLD AUTO: 0.05 K/UL (ref 0–0.2)
BASOPHILS NFR BLD: 0.6 % (ref 0–1.9)
BILIRUB SERPL-MCNC: 0.5 MG/DL (ref 0.1–1)
BUN SERPL-MCNC: 12 MG/DL (ref 6–20)
CALCIUM SERPL-MCNC: 10 MG/DL (ref 8.7–10.5)
CHLORIDE SERPL-SCNC: 106 MMOL/L (ref 95–110)
CO2 SERPL-SCNC: 23 MMOL/L (ref 23–29)
CREAT SERPL-MCNC: 1.1 MG/DL (ref 0.5–1.4)
DIFFERENTIAL METHOD BLD: ABNORMAL
EOSINOPHIL # BLD AUTO: 0.2 K/UL (ref 0–0.5)
EOSINOPHIL NFR BLD: 1.8 % (ref 0–8)
ERYTHROCYTE [DISTWIDTH] IN BLOOD BY AUTOMATED COUNT: 12.6 % (ref 11.5–14.5)
EST. GFR  (NO RACE VARIABLE): >60 ML/MIN/1.73 M^2
GLUCOSE SERPL-MCNC: 89 MG/DL (ref 70–110)
HCT VFR BLD AUTO: 46.1 % (ref 40–54)
HGB BLD-MCNC: 15.7 G/DL (ref 14–18)
IMM GRANULOCYTES # BLD AUTO: 0.02 K/UL (ref 0–0.04)
IMM GRANULOCYTES NFR BLD AUTO: 0.2 % (ref 0–0.5)
LYMPHOCYTES # BLD AUTO: 1.1 K/UL (ref 1–4.8)
LYMPHOCYTES NFR BLD: 13.2 % (ref 18–48)
MAGNESIUM SERPL-MCNC: 1.8 MG/DL (ref 1.6–2.6)
MCH RBC QN AUTO: 29.5 PG (ref 27–31)
MCHC RBC AUTO-ENTMCNC: 34.1 G/DL (ref 32–36)
MCV RBC AUTO: 87 FL (ref 82–98)
MONOCYTES # BLD AUTO: 0.4 K/UL (ref 0.3–1)
MONOCYTES NFR BLD: 5.1 % (ref 4–15)
NEUTROPHILS # BLD AUTO: 6.5 K/UL (ref 1.8–7.7)
NEUTROPHILS NFR BLD: 79.1 % (ref 38–73)
NRBC BLD-RTO: 0 /100 WBC
PLATELET # BLD AUTO: 299 K/UL (ref 150–450)
PMV BLD AUTO: 10 FL (ref 9.2–12.9)
POTASSIUM SERPL-SCNC: 4.1 MMOL/L (ref 3.5–5.1)
PROT SERPL-MCNC: 8.5 G/DL (ref 6–8.4)
RBC # BLD AUTO: 5.33 M/UL (ref 4.6–6.2)
SODIUM SERPL-SCNC: 143 MMOL/L (ref 136–145)
WBC # BLD AUTO: 8.24 K/UL (ref 3.9–12.7)

## 2024-02-19 PROCEDURE — 80053 COMPREHEN METABOLIC PANEL: CPT | Performed by: NURSE PRACTITIONER

## 2024-02-19 PROCEDURE — 25000003 PHARM REV CODE 250: Performed by: NURSE PRACTITIONER

## 2024-02-19 PROCEDURE — 96374 THER/PROPH/DIAG INJ IV PUSH: CPT

## 2024-02-19 PROCEDURE — 83735 ASSAY OF MAGNESIUM: CPT | Performed by: NURSE PRACTITIONER

## 2024-02-19 PROCEDURE — 85025 COMPLETE CBC W/AUTO DIFF WBC: CPT | Performed by: NURSE PRACTITIONER

## 2024-02-19 PROCEDURE — 99284 EMERGENCY DEPT VISIT MOD MDM: CPT | Mod: 25

## 2024-02-19 PROCEDURE — 63600175 PHARM REV CODE 636 W HCPCS: Performed by: NURSE PRACTITIONER

## 2024-02-19 PROCEDURE — 96361 HYDRATE IV INFUSION ADD-ON: CPT

## 2024-02-19 RX ORDER — DIAZEPAM 10 MG/2ML
2.5 INJECTION INTRAMUSCULAR
Status: COMPLETED | OUTPATIENT
Start: 2024-02-19 | End: 2024-02-19

## 2024-02-19 RX ORDER — DIAZEPAM 5 MG/1
5 TABLET ORAL EVERY 6 HOURS PRN
Qty: 20 TABLET | Refills: 0 | Status: SHIPPED | OUTPATIENT
Start: 2024-02-19 | End: 2024-02-24

## 2024-02-19 RX ADMIN — SODIUM CHLORIDE 1000 ML: 9 INJECTION, SOLUTION INTRAVENOUS at 04:02

## 2024-02-19 RX ADMIN — DIAZEPAM 2.5 MG: 10 INJECTION, SOLUTION INTRAMUSCULAR; INTRAVENOUS at 04:02

## 2024-02-19 NOTE — ED PROVIDER NOTES
"Encounter Date: 2/19/2024       History     Chief Complaint   Patient presents with    Dizziness     Pt complains of dizziness and emesis, with hx of dizziness, pt has been seen by neuro for dizziness. Hx of vertigo,  VAN neg      Patient is a 28-year-old male with a past medical history of hypertension who presents to emergency room for dizziness and multiple episodes of vomiting since waking up this morning.  Patient states that he has a history of vertigo and has been seen by Neurology with no acute findings.  He has also been having tinnitus in his left ear.  He was previously on meclizine, which helped symptoms, but has not needed to take medication "in a long time." Denies weakness, numbness, weakness, tingling, abdominal pain, fever, congestion, ear pain, or decreased hearing.  No treatment attempted prior to arrival.    The history is provided by the patient. No  was used.     Review of patient's allergies indicates:  No Known Allergies  Past Medical History:   Diagnosis Date    Hypertension      No past surgical history on file.  Family History   Problem Relation Age of Onset    Hypertension Mother      Social History     Tobacco Use    Smoking status: Never    Smokeless tobacco: Never   Substance Use Topics    Alcohol use: No    Drug use: No     Review of Systems   Constitutional:  Negative for chills, diaphoresis, fatigue and fever.   HENT:  Negative for congestion, sore throat and trouble swallowing.    Respiratory:  Negative for cough and shortness of breath.    Cardiovascular:  Negative for chest pain and palpitations.   Gastrointestinal:  Positive for nausea and vomiting. Negative for abdominal pain, blood in stool, constipation and diarrhea.   Genitourinary:  Negative for difficulty urinating, dysuria, frequency and hematuria.   Musculoskeletal:  Negative for back pain and myalgias.   Skin:  Negative for rash and wound.   Neurological:  Positive for dizziness. Negative for " weakness, light-headedness, numbness and headaches.       Physical Exam     Initial Vitals [02/19/24 1430]   BP Pulse Resp Temp SpO2   (!) 142/87 68 18 98.5 °F (36.9 °C) 97 %      MAP       --         Physical Exam    Nursing note and vitals reviewed.  Constitutional: He appears well-developed and well-nourished. He is not diaphoretic. No distress.   Patient lying in bed, well-appearing.  Awake and alert.  No acute distress.   HENT:   Head: Normocephalic and atraumatic.   Right Ear: Hearing, tympanic membrane, external ear and ear canal normal.   Left Ear: Hearing, tympanic membrane, external ear and ear canal normal.   Eyes: Conjunctivae and EOM are normal. Pupils are equal, round, and reactive to light. No scleral icterus.   No nystagmus.   Neck: Neck supple.   Normal range of motion.  Cardiovascular:  Normal rate, regular rhythm and normal heart sounds.     Exam reveals no friction rub.       No murmur heard.  Pulmonary/Chest: Breath sounds normal. No respiratory distress. He has no wheezes. He has no rhonchi. He has no rales.   Abdominal: Abdomen is soft. Bowel sounds are normal. He exhibits no distension. There is no abdominal tenderness. There is no rebound and no guarding.   Musculoskeletal:         General: No tenderness or edema. Normal range of motion.      Cervical back: Normal range of motion and neck supple.     Neurological: He is alert and oriented to person, place, and time. He has normal strength.   Skin: Skin is warm and dry. No erythema.   Psychiatric: He has a normal mood and affect. His behavior is normal. Thought content normal.         ED Course   Procedures  Labs Reviewed   COMPREHENSIVE METABOLIC PANEL - Abnormal; Notable for the following components:       Result Value    Total Protein 8.5 (*)     ALT 56 (*)     All other components within normal limits   CBC W/ AUTO DIFFERENTIAL - Abnormal; Notable for the following components:    Gran % 79.1 (*)     Lymph % 13.2 (*)     All other  components within normal limits   MAGNESIUM          Imaging Results    None          Medications   sodium chloride 0.9% bolus 1,000 mL 1,000 mL (0 mLs Intravenous Stopped 2/19/24 1706)   diazePAM injection 2.5 mg (2.5 mg Intravenous Given 2/19/24 1615)     Medical Decision Making  Patient presents to emergency room for dizziness and vomiting.  Vital signs stable and within normal limits.  Physical exam as stated above.    Differential Diagnosis includes, but is not limited to peripheral vertigo (labyrinthitis, vestibular neuritis, BPPV, Meniere's disease), cerebellar stroke/CVA, TIA, SAH, carotid artery dissection, intracranial mass, medication reaction/noncompliance, substance abuse, electrolyte abnormality, anemia, hemorrhage, renal failure, hepatic failure, sepsis/infection, UTI, viral illness, arrhythmia, CHF, thyroid disease, dehydration, depression, or chronic disease.  Patient clinically well-appearing and neurologically intact.  Unlikely stroke or CVA.  No chest pain that would suggest dissection.  Lab work relatively unremarkable.  No signs of anemia or electrolyte abnormality.  No renal failure or hepatic failure.  Patient afebrile.  Unlikely sepsis or infectious etiology.  Clinical presentation and physical exam most suggestive of peripheral vertigo.  Patient has tried meclizine in the past without relief.  Therefore, he was given Valium in the emergency room with resolution of symptoms. Will prescribe short course to take upon discharge.  Risks and benefits of medication discussed.  Patient verbalized understanding.    I see no indication of an emergent process beyond that addressed during our encounter. Patient stable for discharge at this time. I have counseled the patient regarding follow up with PCP and gave strict return precautions. I have discussed the final diagnosis and gave instructions regarding prescribed medications. Patient verbalized understanding and is agreeable.     Amount and/or  Complexity of Data Reviewed  Labs:  Decision-making details documented in ED Course.    Risk  Prescription drug management.               ED Course as of 02/19/24 1758   Mon Feb 19, 2024   1430 Impression:     1. No acute intracranial findings.  2. No acute large vessel occlusion or flow-limiting stenosis.  3. Question medialization of the right true vocal cord with dilatation of the ipsilateral piriform sinus.  Findings nonspecific, but may be seen in setting of right vocal cord paralysis/paresis.  Clinical correlation recommended in this regard.  No definite abnormality seen along the anticipated course of the right vagus or recurrent laryngeal nerves.  4. Dental caries and periapical lucencies, possible periapical abscesses as described.  Clinical correlation recommended.   [DT]   1643 CBC auto differential(!)  CBC relatively unremarkable.  H/H within normal limits.  No increase in white blood cells.  Platelet count within normal limits. [BJ]   1716 On reevaluation, patient states that he feels much better. Will plan for discharge.  [BJ]   1716 Comprehensive metabolic panel(!)  CMP unremarkable. BUN and Cr WNL. LFTs WNL. Electrolytes WNL.  [BJ]   1717 Magnesium  Magnesium WNL.  [BJ]      ED Course User Index  [BJ] Ciara Julian PA-C  [DT] Nereida Tamayo NP                           Clinical Impression:  Final diagnoses:  [R42] Dizziness (Primary)  [R42] Vertigo          ED Disposition Condition    Discharge Stable          ED Prescriptions       Medication Sig Dispense Start Date End Date Auth. Provider    diazePAM (VALIUM) 5 MG tablet Take 1 tablet (5 mg total) by mouth every 6 (six) hours as needed for Anxiety. 20 tablet 2/19/2024 2/24/2024 Ciara Julian PA-C          Follow-up Information       Follow up With Specialties Details Why Contact Info    Piter Thornton MD Family Medicine Schedule an appointment as soon as possible for a visit   [address] [facility] [id number] [phone]                Ciara Julian PA-C  02/19/24 1757

## 2024-02-19 NOTE — DISCHARGE INSTRUCTIONS
Please follow up with your PCP as soon as possible.    Thank you for allowing me and my emergency team to take care of you here today! I hope you feel better soon. Please do not hesitate to return with any additional concerns that may arise from this or any new problem you encounter.    Our goal in the emergency department is to always give you outstanding care and exceptional service. If you receive a survey by mail or e-mail in the next week regarding your experience in our ED, we would greatly appreciate you completing it. Your feedback provides us with a way to recognize our staff who give very good care and it helps us learn how to improve when your experience was below the excellence we aspire to be!    Brook Juneau, PA-C [facility]   Emergency Room Physician Assistant

## 2024-02-19 NOTE — ED NOTES
Pt reports to ED with c/o dizziness, nausea, and emesis. Pt has hx of vertigo.     Adult Physical Assessment  LOC: Claudy Alatorre, 28 y.o. male verified via two identifiers.  The patient is awake, alert, oriented and speaking appropriately at this time.  APPEARANCE: Patient resting comfortably and appears to be in no acute distress at this time. Patient is clean and well groomed, patient's clothing is properly fastened.  SKIN:The skin is warm and dry, color consistent with ethnicity, patient has normal skin turgor and moist mucus membranes, skin intact, no breakdown or brusing noted.  MUSCULOSKELETAL: Patient moving all extremities well, no obvious swelling or deformities noted.  RESPIRATORY: Airway is open and patent, respirations are spontaneous, patient has a normal effort and rate, no accessory muscle use noted.  CARDIAC: Patient has a normal rate and rhythm, no periphreal edema noted in any extremity, capillary refill < 3 seconds in all extremities  ABDOMEN: Soft and non tender to palpation, no abdominal distention noted. Bowel sounds present in all four quadrants. Pt reports N/V.   NEUROLOGIC: Eyes open spontaneously, behavior appropriate to situation, follows commands, facial expression symmetrical, bilateral hand grasp equal and even, purposeful motor response noted, normal sensation in all extremities when touched with a finger. Pt reports dizziness.

## 2024-02-22 ENCOUNTER — HOSPITAL ENCOUNTER (EMERGENCY)
Facility: HOSPITAL | Age: 29
Discharge: HOME | End: 2024-02-22
Attending: STUDENT IN AN ORGANIZED HEALTH CARE EDUCATION/TRAINING PROGRAM
Payer: MEDICAID

## 2024-02-22 VITALS
SYSTOLIC BLOOD PRESSURE: 141 MMHG | WEIGHT: 272.94 LBS | HEART RATE: 78 BPM | TEMPERATURE: 97 F | BODY MASS INDEX: 40.3 KG/M2 | OXYGEN SATURATION: 96 % | DIASTOLIC BLOOD PRESSURE: 80 MMHG | RESPIRATION RATE: 16 BRPM

## 2024-02-22 DIAGNOSIS — R42 DIZZINESS: Primary | ICD-10-CM

## 2024-02-22 PROCEDURE — 96361 HYDRATE IV INFUSION ADD-ON: CPT

## 2024-02-22 PROCEDURE — 96374 THER/PROPH/DIAG INJ IV PUSH: CPT

## 2024-02-22 PROCEDURE — 99284 EMERGENCY DEPT VISIT MOD MDM: CPT | Mod: 25

## 2024-02-22 PROCEDURE — 25000003 PHARM REV CODE 250

## 2024-02-22 PROCEDURE — 63600175 PHARM REV CODE 636 W HCPCS

## 2024-02-22 RX ORDER — PROCHLORPERAZINE EDISYLATE 5 MG/ML
5 INJECTION INTRAMUSCULAR; INTRAVENOUS
Status: COMPLETED | OUTPATIENT
Start: 2024-02-22 | End: 2024-02-22

## 2024-02-22 RX ORDER — MECLIZINE HCL 12.5 MG 12.5 MG/1
12.5 TABLET ORAL
Status: COMPLETED | OUTPATIENT
Start: 2024-02-22 | End: 2024-02-22

## 2024-02-22 RX ADMIN — PROCHLORPERAZINE EDISYLATE 5 MG: 5 INJECTION INTRAMUSCULAR; INTRAVENOUS at 02:02

## 2024-02-22 RX ADMIN — MECLIZINE 12.5 MG: 12.5 TABLET ORAL at 01:02

## 2024-02-22 RX ADMIN — SODIUM CHLORIDE 500 ML: 9 INJECTION, SOLUTION INTRAVENOUS at 02:02

## 2024-02-22 NOTE — ED PROVIDER NOTES
Encounter Date: 2/22/2024       History     Chief Complaint   Patient presents with    Dizziness     Dx hx of vertigo.  Rx medications not helping.      29 y/o M with a PMHx of hypertension and vertigo on meclizine presents to the ED complaining dizziness that onset today.  States he took his meclizine this morning which helped alleviate his dizziness any was able to go to work.  He is presenting to the ED now because he complains of continued dizziness.  He states that he went to Neurology and he was reassured that it was likely just peripheral vertigo.  He was seen in the emergency department if you days ago and was discharged with Valium.  He states that he has taken the Valium at night which helps him fall asleep but he is only taken 2 doses.  He denies any other symptoms at this time.     The history is provided by the patient and medical records.     Review of patient's allergies indicates:  No Known Allergies  Past Medical History:   Diagnosis Date    Hypertension      No past surgical history on file.  Family History   Problem Relation Age of Onset    Hypertension Mother      Social History     Tobacco Use    Smoking status: Never    Smokeless tobacco: Never   Substance Use Topics    Alcohol use: No    Drug use: No     Review of Systems    Physical Exam     Initial Vitals [02/22/24 1253]   BP Pulse Resp Temp SpO2   (!) 141/80 78 16 97.4 °F (36.3 °C) 96 %      MAP       --         Physical Exam    Nursing note and vitals reviewed.  Constitutional: Vital signs are normal. He appears well-developed and well-nourished. He is not diaphoretic. No distress.   HENT:   Head: Normocephalic and atraumatic.   Right Ear: External ear normal.   Left Ear: External ear normal.   Eyes: EOM are normal. Right eye exhibits no discharge. Left eye exhibits no discharge.   Neck: Trachea normal. Neck supple. No thyroid mass present.   Cardiovascular:  Normal rate, regular rhythm, normal heart sounds and intact distal pulses.      Exam reveals no gallop and no friction rub.       No murmur heard.  Pulmonary/Chest: Breath sounds normal. No respiratory distress. He has no wheezes. He has no rhonchi. He has no rales.   Abdominal: Abdomen is soft. Bowel sounds are normal. He exhibits no distension. There is no abdominal tenderness. There is no rebound and no guarding.   Musculoskeletal:         General: No tenderness or edema. Normal range of motion.      Cervical back: Neck supple.     Neurological: He is alert and oriented to person, place, and time. He has normal strength. No cranial nerve deficit or sensory deficit. GCS score is 15. GCS eye subscore is 4. GCS verbal subscore is 5. GCS motor subscore is 6.   5/5 strength in the bilateral upper and lower extremities.  No pronator drift.  No dysarthria.  No facial asymmetry.  Sensation intact in all 4 extremities.  Finger-to-nose testing is normal bilaterally.        Skin: Skin is warm and dry. Capillary refill takes less than 2 seconds. No rash noted.   Psychiatric: He has a normal mood and affect.         ED Course   Procedures  Labs Reviewed - No data to display       Imaging Results    None          Medications   sodium chloride 0.9% bolus 500 mL 500 mL (500 mLs Intravenous New Bag 2/22/24 1426)   meclizine tablet 12.5 mg (12.5 mg Oral Given 2/22/24 1358)   prochlorperazine injection Soln 5 mg (5 mg Intravenous Given 2/22/24 1426)     Medical Decision Making  28-year-old male who appears to be in NAD presents to the ED complaining of dizziness.  ABC's intact.  Initial triage vital signs reveal mild hypertension and otherwise unremarkable.    Differential diagnosis includes but is not limited to peripheral vertigo,    Risk  Prescription drug management.               ED Course as of 02/22/24 1525   Thu Feb 22, 2024   1400 I performed the Epley maneuver during my initial evaluation.  Following initial evaluation I will give the patient prescribed meclizine, food bolus and 5 mg of IV Compazine  or complaints of dizziness.  Will re-evaluate patient following medication administration. [BG]   1507 Upon re-evaluation patient states that it feels much better.  I recommend that he continue his meclizine regimen.  He states that he understands and agrees with the plan.  I will discharge the patient following fluid administration.  Return precautions provided. [BG]      ED Course User Index  [BG] Estiven Tomas MD                           Clinical Impression:  Final diagnoses:  [R42] Dizziness (Primary)          ED Disposition Condition    Discharge Stable          ED Prescriptions    None       Follow-up Information       Follow up With Specialties Details Why Contact Info    Piter Thornton MD Family Medicine Schedule an appointment as soon as possible for a visit in 1 week As needed [address] [facility] [id number]  [phone]      [place] - Emergency Dept Emergency Medicine Go to  If symptoms worsen [address] [facility] [address]  [phone]             Estiven Tomas MD  Resident  02/22/24 2733

## 2024-02-22 NOTE — DISCHARGE INSTRUCTIONS
Please return to the emergency department if you develop any new or worsening symptoms.  This could include worsening dizziness, changes in mental status.     Otherwise follow-up with your primary care physician as needed

## 2024-02-22 NOTE — ED TRIAGE NOTES
Pt presents to ED today c/o dizziness and feeling unsteady x 3 months   Unrelieved by rx for meclizine and promethazine   NAD noted